# Patient Record
Sex: MALE | URBAN - METROPOLITAN AREA
[De-identification: names, ages, dates, MRNs, and addresses within clinical notes are randomized per-mention and may not be internally consistent; named-entity substitution may affect disease eponyms.]

---

## 2019-06-06 ENCOUNTER — APPOINTMENT (RX ONLY)
Dept: URBAN - METROPOLITAN AREA CLINIC 122 | Facility: CLINIC | Age: 19
Setting detail: DERMATOLOGY
End: 2019-06-06

## 2019-06-06 DIAGNOSIS — L29.89 OTHER PRURITUS: ICD-10-CM

## 2019-06-06 DIAGNOSIS — L29.8 OTHER PRURITUS: ICD-10-CM

## 2019-06-06 DIAGNOSIS — L21.8 OTHER SEBORRHEIC DERMATITIS: ICD-10-CM

## 2019-06-06 PROBLEM — J45.909 UNSPECIFIED ASTHMA, UNCOMPLICATED: Status: ACTIVE | Noted: 2019-06-06

## 2019-06-06 PROBLEM — L55.1 SUNBURN OF SECOND DEGREE: Status: ACTIVE | Noted: 2019-06-06

## 2019-06-06 PROBLEM — L85.3 XEROSIS CUTIS: Status: ACTIVE | Noted: 2019-06-06

## 2019-06-06 PROBLEM — H91.90 UNSPECIFIED HEARING LOSS, UNSPECIFIED EAR: Status: ACTIVE | Noted: 2019-06-06

## 2019-06-06 PROCEDURE — ? COUNSELING

## 2019-06-06 PROCEDURE — 99203 OFFICE O/P NEW LOW 30 MIN: CPT

## 2019-06-06 PROCEDURE — ? PRESCRIPTION

## 2019-06-06 RX ORDER — HALOBETASOL PROPIONATE 0.5 MG/G
SMALL AEROSOL, FOAM TOPICAL BID
Qty: 50 | Refills: 5 | Status: ERX | COMMUNITY
Start: 2019-06-06

## 2019-06-06 RX ADMIN — HALOBETASOL PROPIONATE SMALL: 0.5 AEROSOL, FOAM TOPICAL at 15:38

## 2019-06-06 NOTE — HPI: RASH
What Type Of Note Output Would You Prefer (Optional)?: Standard Output
How Severe Is Your Rash?: mild
Is This A New Presentation, Or A Follow-Up?: Rash
Additional History: Patient states he tried ketoconazole cream with no improvement

## 2020-03-17 RX ORDER — HALOBETASOL PROPIONATE 0.5 MG/G
1 AEROSOL, FOAM TOPICAL BID
Qty: 50 | Refills: 2 | Status: ERX